# Patient Record
Sex: MALE | Race: WHITE | NOT HISPANIC OR LATINO | Employment: OTHER | URBAN - METROPOLITAN AREA
[De-identification: names, ages, dates, MRNs, and addresses within clinical notes are randomized per-mention and may not be internally consistent; named-entity substitution may affect disease eponyms.]

---

## 2017-01-01 ENCOUNTER — OFFICE VISIT (OUTPATIENT)
Dept: ENDOCRINOLOGY | Age: 82
End: 2017-01-01

## 2017-01-01 ENCOUNTER — TELEPHONE (OUTPATIENT)
Dept: CARDIOLOGY | Facility: CLINIC | Age: 82
End: 2017-01-01

## 2017-01-01 ENCOUNTER — TRANSCRIBE ORDERS (OUTPATIENT)
Dept: ADMINISTRATIVE | Facility: HOSPITAL | Age: 82
End: 2017-01-01

## 2017-01-01 ENCOUNTER — OFFICE VISIT (OUTPATIENT)
Dept: FAMILY MEDICINE CLINIC | Facility: CLINIC | Age: 82
End: 2017-01-01

## 2017-01-01 ENCOUNTER — TELEPHONE (OUTPATIENT)
Dept: FAMILY MEDICINE CLINIC | Facility: CLINIC | Age: 82
End: 2017-01-01

## 2017-01-01 ENCOUNTER — OFFICE VISIT (OUTPATIENT)
Dept: CARDIOLOGY | Facility: CLINIC | Age: 82
End: 2017-01-01

## 2017-01-01 ENCOUNTER — LAB (OUTPATIENT)
Dept: LAB | Facility: HOSPITAL | Age: 82
End: 2017-01-01
Attending: INTERNAL MEDICINE

## 2017-01-01 VITALS
OXYGEN SATURATION: 95 % | SYSTOLIC BLOOD PRESSURE: 120 MMHG | DIASTOLIC BLOOD PRESSURE: 78 MMHG | WEIGHT: 156 LBS | TEMPERATURE: 97.5 F | HEIGHT: 71 IN | BODY MASS INDEX: 21.84 KG/M2 | HEART RATE: 76 BPM

## 2017-01-01 VITALS
DIASTOLIC BLOOD PRESSURE: 68 MMHG | BODY MASS INDEX: 21.65 KG/M2 | TEMPERATURE: 98 F | WEIGHT: 155.2 LBS | SYSTOLIC BLOOD PRESSURE: 128 MMHG

## 2017-01-01 VITALS
HEART RATE: 67 BPM | DIASTOLIC BLOOD PRESSURE: 64 MMHG | BODY MASS INDEX: 22.12 KG/M2 | SYSTOLIC BLOOD PRESSURE: 122 MMHG | WEIGHT: 158 LBS | HEIGHT: 71 IN

## 2017-01-01 VITALS
OXYGEN SATURATION: 91 % | DIASTOLIC BLOOD PRESSURE: 82 MMHG | BODY MASS INDEX: 22.68 KG/M2 | HEIGHT: 71 IN | HEART RATE: 72 BPM | SYSTOLIC BLOOD PRESSURE: 128 MMHG | WEIGHT: 162 LBS

## 2017-01-01 VITALS
HEIGHT: 71 IN | HEART RATE: 71 BPM | DIASTOLIC BLOOD PRESSURE: 60 MMHG | BODY MASS INDEX: 21.84 KG/M2 | WEIGHT: 156 LBS | SYSTOLIC BLOOD PRESSURE: 140 MMHG

## 2017-01-01 DIAGNOSIS — I25.10 CAD S/P PERCUTANEOUS CORONARY ANGIOPLASTY: Primary | ICD-10-CM

## 2017-01-01 DIAGNOSIS — Z79.4 ENCOUNTER FOR LONG-TERM (CURRENT) USE OF INSULIN (HCC): ICD-10-CM

## 2017-01-01 DIAGNOSIS — D63.1 ANEMIA SECONDARY TO RENAL FAILURE: ICD-10-CM

## 2017-01-01 DIAGNOSIS — IMO0002 UNCONTROLLED TYPE 2 DIABETES MELLITUS WITH DIABETIC NEUROPATHY, WITH LONG-TERM CURRENT USE OF INSULIN: ICD-10-CM

## 2017-01-01 DIAGNOSIS — N18.9 ANEMIA SECONDARY TO RENAL FAILURE: ICD-10-CM

## 2017-01-01 DIAGNOSIS — Z89.511 HISTORY OF AMPUTATION OF RIGHT LOWER EXTREMITY THROUGH TIBIA AND FIBULA (HCC): ICD-10-CM

## 2017-01-01 DIAGNOSIS — F41.9 ANXIETY: Primary | ICD-10-CM

## 2017-01-01 DIAGNOSIS — N18.9 ANEMIA IN CHRONIC KIDNEY DISEASE (CKD): ICD-10-CM

## 2017-01-01 DIAGNOSIS — E78.5 DYSLIPIDEMIA: ICD-10-CM

## 2017-01-01 DIAGNOSIS — I48.20 CHRONIC ATRIAL FIBRILLATION (HCC): ICD-10-CM

## 2017-01-01 DIAGNOSIS — N18.30 CHRONIC KIDNEY DISEASE, STAGE III (MODERATE) (HCC): ICD-10-CM

## 2017-01-01 DIAGNOSIS — R12 HEARTBURN: ICD-10-CM

## 2017-01-01 DIAGNOSIS — E16.1 HYPERINSULINISM: ICD-10-CM

## 2017-01-01 DIAGNOSIS — N18.30 CHRONIC KIDNEY DISEASE, STAGE III (MODERATE) (HCC): Primary | ICD-10-CM

## 2017-01-01 DIAGNOSIS — E55.9 VITAMIN D DEFICIENCY: ICD-10-CM

## 2017-01-01 DIAGNOSIS — E55.9 VITAMIN D DEFICIENCY: Primary | ICD-10-CM

## 2017-01-01 DIAGNOSIS — Z98.61 CAD S/P PERCUTANEOUS CORONARY ANGIOPLASTY: Primary | ICD-10-CM

## 2017-01-01 DIAGNOSIS — D63.1 ANEMIA IN CHRONIC KIDNEY DISEASE (CKD): ICD-10-CM

## 2017-01-01 DIAGNOSIS — IMO0002 UNCONTROLLED TYPE II DIABETES MELLITUS WITH NEPHROPATHY: Primary | ICD-10-CM

## 2017-01-01 DIAGNOSIS — IMO0002 UNCONTROLLED TYPE 2 DIABETES MELLITUS WITH COMPLICATION, WITH LONG-TERM CURRENT USE OF INSULIN: ICD-10-CM

## 2017-01-01 DIAGNOSIS — E55.9 VITAMIN D DEFICIENCY DISEASE: ICD-10-CM

## 2017-01-01 DIAGNOSIS — I77.9 PERIPHERAL ARTERIAL OCCLUSIVE DISEASE (HCC): ICD-10-CM

## 2017-01-01 DIAGNOSIS — N18.9 CHRONIC KIDNEY DISEASE, UNSPECIFIED CKD STAGE: ICD-10-CM

## 2017-01-01 DIAGNOSIS — J40 BRONCHITIS: Primary | ICD-10-CM

## 2017-01-01 LAB
25(OH)D3 SERPL-MCNC: 34.6 NG/ML
25(OH)D3 SERPL-MCNC: 45.4 NG/ML
ALBUMIN SERPL-MCNC: 4 G/DL (ref 3.5–5.2)
ALBUMIN SERPL-MCNC: 4 G/DL (ref 3.5–5.2)
ALBUMIN/CREAT UR: 102.3 MG/G CREAT (ref 0–30)
ALBUMIN/GLOB SERPL: 1.4 G/DL
ALBUMIN/GLOB SERPL: 1.5 G/DL
ALP SERPL-CCNC: 84 U/L (ref 39–117)
ALP SERPL-CCNC: 87 U/L (ref 39–117)
ALT SERPL-CCNC: 18 U/L (ref 1–41)
ALT SERPL-CCNC: 20 U/L (ref 1–41)
ANION GAP SERPL CALCULATED.3IONS-SCNC: 17.5 MMOL/L
AST SERPL-CCNC: 18 U/L (ref 1–40)
AST SERPL-CCNC: 23 U/L (ref 1–40)
BACTERIA UR QL AUTO: ABNORMAL /HPF
BACTERIA UR QL AUTO: ABNORMAL /HPF
BASOPHILS # BLD AUTO: 0.07 10*3/MM3 (ref 0–0.2)
BASOPHILS # BLD AUTO: 0.09 10*3/MM3 (ref 0–0.2)
BASOPHILS NFR BLD AUTO: 0.7 % (ref 0–2)
BASOPHILS NFR BLD AUTO: 1 % (ref 0–2)
BILIRUB SERPL-MCNC: 0.4 MG/DL (ref 0.1–1.2)
BILIRUB SERPL-MCNC: 0.5 MG/DL (ref 0.1–1.2)
BILIRUB UR QL STRIP: ABNORMAL
BILIRUB UR QL STRIP: NEGATIVE
BUN BLD-MCNC: 27 MG/DL (ref 8–23)
BUN SERPL-MCNC: 25 MG/DL (ref 8–23)
BUN SERPL-MCNC: 26 MG/DL (ref 8–23)
BUN/CREAT SERPL: 17.4 (ref 7–25)
BUN/CREAT SERPL: 17.7 (ref 7–25)
BUN/CREAT SERPL: 20.8 (ref 7–25)
CALCIUM SERPL-MCNC: 9.4 MG/DL (ref 8.6–10.5)
CALCIUM SERPL-MCNC: 9.5 MG/DL (ref 8.6–10.5)
CALCIUM SPEC-SCNC: 8.6 MG/DL (ref 8.8–10.5)
CHLORIDE SERPL-SCNC: 101 MMOL/L (ref 98–107)
CHLORIDE SERPL-SCNC: 102 MMOL/L (ref 98–107)
CHLORIDE SERPL-SCNC: 105 MMOL/L (ref 98–107)
CLARITY UR: CLEAR
CLARITY UR: CLEAR
CO2 SERPL-SCNC: 21.5 MMOL/L (ref 22–29)
CO2 SERPL-SCNC: 23.4 MMOL/L (ref 22–29)
CO2 SERPL-SCNC: 25.9 MMOL/L (ref 22–29)
COLOR UR: YELLOW
COLOR UR: YELLOW
CREAT BLD-MCNC: 1.55 MG/DL (ref 0.76–1.27)
CREAT SERPL-MCNC: 1.25 MG/DL (ref 0.76–1.27)
CREAT SERPL-MCNC: 1.41 MG/DL (ref 0.76–1.27)
CREAT UR-MCNC: 30.7 MG/DL
DEPRECATED RDW RBC AUTO: 48.2 FL (ref 37–54)
DEPRECATED RDW RBC AUTO: 53.6 FL (ref 37–54)
EOSINOPHIL # BLD AUTO: 0.28 10*3/MM3 (ref 0.1–0.3)
EOSINOPHIL # BLD AUTO: 0.33 10*3/MM3 (ref 0.1–0.3)
EOSINOPHIL NFR BLD AUTO: 3.2 % (ref 0–4)
EOSINOPHIL NFR BLD AUTO: 3.2 % (ref 0–4)
ERYTHROCYTE [DISTWIDTH] IN BLOOD BY AUTOMATED COUNT: 14.2 % (ref 11.5–14.5)
ERYTHROCYTE [DISTWIDTH] IN BLOOD BY AUTOMATED COUNT: 14.8 % (ref 11.5–14.5)
EXPIRATION DATE: NORMAL
FLUAV AG NPH QL: NEGATIVE
FLUBV AG NPH QL: NEGATIVE
GFR SERPL CREATININE-BSD FRML MDRD: 43 ML/MIN/1.73
GLOBULIN SER CALC-MCNC: 2.7 GM/DL
GLOBULIN SER CALC-MCNC: 2.9 GM/DL
GLUCOSE BLD-MCNC: 221 MG/DL (ref 65–99)
GLUCOSE SERPL-MCNC: 182 MG/DL (ref 65–99)
GLUCOSE SERPL-MCNC: 70 MG/DL (ref 65–99)
GLUCOSE UR STRIP-MCNC: ABNORMAL MG/DL
GLUCOSE UR STRIP-MCNC: NEGATIVE MG/DL
HBA1C MFR BLD: 8.43 % (ref 4.8–5.6)
HBA1C MFR BLD: 9.2 % (ref 4.8–5.6)
HCT VFR BLD AUTO: 40.5 % (ref 42–52)
HCT VFR BLD AUTO: 41.1 % (ref 42–52)
HGB BLD-MCNC: 12.8 G/DL (ref 14–18)
HGB BLD-MCNC: 13 G/DL (ref 14–18)
HGB UR QL STRIP.AUTO: ABNORMAL
HGB UR QL STRIP.AUTO: NEGATIVE
HYALINE CASTS UR QL AUTO: ABNORMAL /LPF
HYALINE CASTS UR QL AUTO: ABNORMAL /LPF
IMM GRANULOCYTES # BLD: 0.04 10*3/MM3 (ref 0–0.03)
IMM GRANULOCYTES # BLD: 0.05 10*3/MM3 (ref 0–0.03)
IMM GRANULOCYTES NFR BLD: 0.4 % (ref 0–0.5)
IMM GRANULOCYTES NFR BLD: 0.6 % (ref 0–0.5)
INTERNAL CONTROL: NORMAL
KETONES UR QL STRIP: ABNORMAL
KETONES UR QL STRIP: NEGATIVE
LEUKOCYTE ESTERASE UR QL STRIP.AUTO: NEGATIVE
LEUKOCYTE ESTERASE UR QL STRIP.AUTO: NEGATIVE
LYMPHOCYTES # BLD AUTO: 0.98 10*3/MM3 (ref 0.6–4.8)
LYMPHOCYTES # BLD AUTO: 1.39 10*3/MM3 (ref 0.6–4.8)
LYMPHOCYTES NFR BLD AUTO: 11.3 % (ref 20–45)
LYMPHOCYTES NFR BLD AUTO: 13.4 % (ref 20–45)
Lab: NORMAL
MCH RBC QN AUTO: 29.3 PG (ref 27–31)
MCH RBC QN AUTO: 30.9 PG (ref 27–31)
MCHC RBC AUTO-ENTMCNC: 31.6 G/DL (ref 31–37)
MCHC RBC AUTO-ENTMCNC: 31.6 G/DL (ref 31–37)
MCV RBC AUTO: 92.8 FL (ref 80–94)
MCV RBC AUTO: 97.8 FL (ref 80–94)
MICROALBUMIN UR-MCNC: 31.4 UG/ML
MONOCYTES # BLD AUTO: 0.62 10*3/MM3 (ref 0–1)
MONOCYTES # BLD AUTO: 0.66 10*3/MM3 (ref 0–1)
MONOCYTES NFR BLD AUTO: 6 % (ref 3–8)
MONOCYTES NFR BLD AUTO: 7.6 % (ref 3–8)
NEUTROPHILS # BLD AUTO: 6.65 10*3/MM3 (ref 1.5–8.3)
NEUTROPHILS # BLD AUTO: 7.91 10*3/MM3 (ref 1.5–8.3)
NEUTROPHILS NFR BLD AUTO: 76.3 % (ref 45–70)
NEUTROPHILS NFR BLD AUTO: 76.3 % (ref 45–70)
NITRITE UR QL STRIP: NEGATIVE
NITRITE UR QL STRIP: NEGATIVE
NRBC BLD MANUAL-RTO: 0 /100 WBC (ref 0–0)
NRBC BLD MANUAL-RTO: 0 /100 WBC (ref 0–0)
PH UR STRIP.AUTO: <=5 [PH] (ref 4.5–8)
PH UR STRIP.AUTO: <=5 [PH] (ref 4.5–8)
PHOSPHATE SERPL-MCNC: 3.3 MG/DL (ref 2.7–4.5)
PHOSPHATE SERPL-MCNC: 3.3 MG/DL (ref 2.7–4.5)
PLATELET # BLD AUTO: 199 10*3/MM3 (ref 140–500)
PLATELET # BLD AUTO: 210 10*3/MM3 (ref 140–500)
PMV BLD AUTO: 10.9 FL (ref 7.4–10.4)
PMV BLD AUTO: 11 FL (ref 7.4–10.4)
POTASSIUM BLD-SCNC: 4.5 MMOL/L (ref 3.5–5.2)
POTASSIUM SERPL-SCNC: 4.5 MMOL/L (ref 3.5–5.2)
POTASSIUM SERPL-SCNC: 5.1 MMOL/L (ref 3.5–5.2)
PROT SERPL-MCNC: 6.7 G/DL (ref 6–8.5)
PROT SERPL-MCNC: 6.9 G/DL (ref 6–8.5)
PROT UR QL STRIP: ABNORMAL
PROT UR QL STRIP: ABNORMAL
PTH-INTACT SERPL-MCNC: 171 PG/ML (ref 15–65)
PTH-INTACT SERPL-MCNC: 94.5 PG/ML (ref 15–65)
RBC # BLD AUTO: 4.14 10*6/MM3 (ref 4.7–6.1)
RBC # BLD AUTO: 4.43 10*6/MM3 (ref 4.7–6.1)
RBC # UR: ABNORMAL /HPF
RBC # UR: ABNORMAL /HPF
REF LAB TEST METHOD: ABNORMAL
REF LAB TEST METHOD: ABNORMAL
SODIUM BLD-SCNC: 144 MMOL/L (ref 136–145)
SODIUM SERPL-SCNC: 142 MMOL/L (ref 136–145)
SODIUM SERPL-SCNC: 143 MMOL/L (ref 136–145)
SP GR UR STRIP: 1.02 (ref 1–1.03)
SP GR UR STRIP: 1.02 (ref 1–1.03)
SQUAMOUS #/AREA URNS HPF: ABNORMAL /HPF
SQUAMOUS #/AREA URNS HPF: ABNORMAL /HPF
TSH SERPL DL<=0.005 MIU/L-ACNC: 2.54 MIU/ML (ref 0.27–4.2)
TSH SERPL DL<=0.005 MIU/L-ACNC: 3.13 MIU/ML (ref 0.27–4.2)
UROBILINOGEN UR QL STRIP: ABNORMAL
UROBILINOGEN UR QL STRIP: ABNORMAL
WBC NRBC COR # BLD: 10.36 10*3/MM3 (ref 4.8–10.8)
WBC NRBC COR # BLD: 8.71 10*3/MM3 (ref 4.8–10.8)
WBC UR QL AUTO: ABNORMAL /HPF
WBC UR QL AUTO: ABNORMAL /HPF

## 2017-01-01 PROCEDURE — 84100 ASSAY OF PHOSPHORUS: CPT

## 2017-01-01 PROCEDURE — 80048 BASIC METABOLIC PNL TOTAL CA: CPT

## 2017-01-01 PROCEDURE — 85025 COMPLETE CBC W/AUTO DIFF WBC: CPT

## 2017-01-01 PROCEDURE — 99214 OFFICE O/P EST MOD 30 MIN: CPT | Performed by: INTERNAL MEDICINE

## 2017-01-01 PROCEDURE — 81001 URINALYSIS AUTO W/SCOPE: CPT

## 2017-01-01 PROCEDURE — 82306 VITAMIN D 25 HYDROXY: CPT

## 2017-01-01 PROCEDURE — 83970 ASSAY OF PARATHORMONE: CPT

## 2017-01-01 PROCEDURE — 36415 COLL VENOUS BLD VENIPUNCTURE: CPT

## 2017-01-01 PROCEDURE — 99213 OFFICE O/P EST LOW 20 MIN: CPT | Performed by: FAMILY MEDICINE

## 2017-01-01 PROCEDURE — 99213 OFFICE O/P EST LOW 20 MIN: CPT | Performed by: INTERNAL MEDICINE

## 2017-01-01 PROCEDURE — 93000 ELECTROCARDIOGRAM COMPLETE: CPT | Performed by: INTERNAL MEDICINE

## 2017-01-01 PROCEDURE — 87804 INFLUENZA ASSAY W/OPTIC: CPT | Performed by: FAMILY MEDICINE

## 2017-01-01 PROCEDURE — 94640 AIRWAY INHALATION TREATMENT: CPT | Performed by: FAMILY MEDICINE

## 2017-01-01 RX ORDER — PANTOPRAZOLE SODIUM 40 MG/1
TABLET, DELAYED RELEASE ORAL
Qty: 90 TABLET | Refills: 0 | Status: SHIPPED | OUTPATIENT
Start: 2017-01-01 | End: 2017-01-01

## 2017-01-01 RX ORDER — ALPRAZOLAM 0.25 MG/1
TABLET ORAL
Qty: 30 TABLET | Refills: 0 | Status: SHIPPED | OUTPATIENT
Start: 2017-01-01 | End: 2017-01-01 | Stop reason: SDUPTHER

## 2017-01-01 RX ORDER — LOVASTATIN 40 MG/1
TABLET ORAL
Qty: 90 TABLET | Refills: 0 | Status: SHIPPED | OUTPATIENT
Start: 2017-01-01 | End: 2017-01-01 | Stop reason: SDUPTHER

## 2017-01-01 RX ORDER — FUROSEMIDE 20 MG/1
20 TABLET ORAL DAILY
Qty: 90 TABLET | Refills: 3 | Status: SHIPPED | OUTPATIENT
Start: 2017-01-01

## 2017-01-01 RX ORDER — CITALOPRAM 20 MG/1
20 TABLET ORAL DAILY
Qty: 90 TABLET | Refills: 3 | Status: SHIPPED | OUTPATIENT
Start: 2017-01-01

## 2017-01-01 RX ORDER — ALPRAZOLAM 0.25 MG/1
0.25 TABLET ORAL 2 TIMES DAILY PRN
Qty: 30 TABLET | Refills: 0 | OUTPATIENT
Start: 2017-01-01 | End: 2017-01-01 | Stop reason: SDUPTHER

## 2017-01-01 RX ORDER — LOVASTATIN 40 MG/1
40 TABLET ORAL NIGHTLY
Qty: 90 TABLET | Refills: 3 | Status: SHIPPED | OUTPATIENT
Start: 2017-01-01

## 2017-01-01 RX ORDER — INSULIN LISPRO 100 [IU]/ML
INJECTION, SOLUTION INTRAVENOUS; SUBCUTANEOUS
Qty: 30 ML | Refills: 1 | Status: SHIPPED | OUTPATIENT
Start: 2017-01-01

## 2017-01-01 RX ORDER — ALPRAZOLAM 0.25 MG/1
0.25 TABLET ORAL 2 TIMES DAILY PRN
Qty: 30 TABLET | Refills: 5 | Status: SHIPPED | OUTPATIENT
Start: 2017-01-01

## 2017-01-01 RX ORDER — CEPHALEXIN 250 MG/1
250 CAPSULE ORAL 4 TIMES DAILY
Qty: 28 CAPSULE | Refills: 0 | Status: SHIPPED | OUTPATIENT
Start: 2017-01-01 | End: 2018-01-01

## 2017-01-01 RX ORDER — LOVASTATIN 40 MG/1
40 TABLET ORAL NIGHTLY
Qty: 30 TABLET | Refills: 0 | Status: SHIPPED | OUTPATIENT
Start: 2017-01-01 | End: 2017-01-01 | Stop reason: SDUPTHER

## 2017-01-01 RX ORDER — LOVASTATIN 40 MG/1
TABLET ORAL
Qty: 90 TABLET | OUTPATIENT
Start: 2017-01-01

## 2017-01-01 RX ORDER — PANTOPRAZOLE SODIUM 40 MG/1
TABLET, DELAYED RELEASE ORAL
Qty: 90 TABLET | Refills: 0 | Status: SHIPPED | OUTPATIENT
Start: 2017-01-01 | End: 2017-01-01 | Stop reason: SDUPTHER

## 2017-01-01 RX ORDER — FUROSEMIDE 20 MG/1
TABLET ORAL
Qty: 90 TABLET | Refills: 1 | Status: SHIPPED | OUTPATIENT
Start: 2017-01-01 | End: 2017-01-01 | Stop reason: SDUPTHER

## 2017-01-01 RX ORDER — CARVEDILOL 3.12 MG/1
3.12 TABLET ORAL
Qty: 30 TABLET | Refills: 1 | Status: SHIPPED | OUTPATIENT
Start: 2017-01-01 | End: 2017-01-01 | Stop reason: ALTCHOICE

## 2017-01-01 RX ORDER — PANTOPRAZOLE SODIUM 40 MG/1
40 TABLET, DELAYED RELEASE ORAL DAILY
Qty: 90 TABLET | Refills: 0 | Status: SHIPPED | OUTPATIENT
Start: 2017-01-01 | End: 2017-01-01 | Stop reason: SDUPTHER

## 2017-01-01 RX ORDER — RANITIDINE 150 MG/1
150 TABLET ORAL 2 TIMES DAILY
Qty: 180 TABLET | Refills: 3 | Status: SHIPPED | OUTPATIENT
Start: 2017-01-01

## 2017-01-01 RX ORDER — HYDROCORTISONE ACETATE 25 MG
SUPPOSITORY, RECTAL RECTAL
COMMUNITY
Start: 2017-01-01

## 2017-01-01 RX ORDER — FUROSEMIDE 20 MG/1
TABLET ORAL
Qty: 90 TABLET | Refills: 0 | Status: SHIPPED | OUTPATIENT
Start: 2017-01-01 | End: 2017-01-01 | Stop reason: SDUPTHER

## 2017-01-01 RX ORDER — SILVER SULFADIAZINE 1 %
CREAM (GRAM) TOPICAL
COMMUNITY
Start: 2017-01-01 | End: 2017-01-01

## 2017-01-06 ENCOUNTER — OFFICE VISIT (OUTPATIENT)
Dept: ENDOCRINOLOGY | Age: 82
End: 2017-01-06

## 2017-01-06 VITALS
BODY MASS INDEX: 22.88 KG/M2 | DIASTOLIC BLOOD PRESSURE: 58 MMHG | OXYGEN SATURATION: 98 % | WEIGHT: 163.4 LBS | HEART RATE: 67 BPM | SYSTOLIC BLOOD PRESSURE: 122 MMHG | HEIGHT: 71 IN

## 2017-01-06 DIAGNOSIS — E16.1 HYPERINSULINISM: ICD-10-CM

## 2017-01-06 DIAGNOSIS — IMO0002 UNCONTROLLED TYPE 2 DIABETES MELLITUS WITH COMPLICATION, WITH LONG-TERM CURRENT USE OF INSULIN: Primary | ICD-10-CM

## 2017-01-06 DIAGNOSIS — IMO0002 UNCONTROLLED TYPE 2 DIABETES MELLITUS WITH DIABETIC NEUROPATHY, WITH LONG-TERM CURRENT USE OF INSULIN: ICD-10-CM

## 2017-01-06 DIAGNOSIS — IMO0002 UNCONTROLLED TYPE II DIABETES MELLITUS WITH NEPHROPATHY: ICD-10-CM

## 2017-01-06 DIAGNOSIS — Z79.4 ENCOUNTER FOR LONG-TERM (CURRENT) USE OF INSULIN (HCC): ICD-10-CM

## 2017-01-06 PROCEDURE — 99214 OFFICE O/P EST MOD 30 MIN: CPT | Performed by: INTERNAL MEDICINE

## 2017-01-06 RX ORDER — ERGOCALCIFEROL 1.25 MG/1
CAPSULE ORAL
COMMUNITY
Start: 2016-12-28 | End: 2017-01-01

## 2017-01-06 NOTE — MR AVS SNAPSHOT
Santyagustin Quiroz   1/6/2017 11:30 AM   Office Visit    Dept Phone:  [phone]   Encounter #:  [id number]    Provider:  Deandre JASON MD   Department:  [facility] ENDOCRINOLOGY                Your Full Care Plan              Your Updated Medication List          This list is accurate as of: 1/6/17 12:34 PM.  Always use your most recent med list.                ALPRAZolam 0.25 MG tablet   Commonly known as:  XANAX   Take 1 tablet by mouth 2 (Two) Times a Day As Needed for anxiety or sleep.       aspirin 81 MG tablet       citalopram 20 MG tablet   Commonly known as:  CeleXA   Take 1 tablet by mouth Daily.       dabigatran etexilate 75 MG capsule   Commonly known as:  PRADAXA       finasteride 5 MG tablet   Commonly known as:  PROSCAR       fluticasone 50 MCG/ACT nasal spray   Commonly known as:  FLONASE       furosemide 20 MG tablet   Commonly known as:  LASIX   TAKE 1 TABLET DAILY AS DIRECTED       glucose blood test strip   Commonly known as:  ONE TOUCH ULTRA TEST   1 each by Other route 5 (five) times a day. TO TEST BLOOD SUGARS 5 TIMES DAILY       Insulin Glargine 100 UNIT/ML injection pen   Commonly known as:  LANTUS SOLOSTAR       insulin lispro 100 UNIT/ML injection   Commonly known as:  humaLOG       loratadine 10 MG tablet   Commonly known as:  CLARITIN       lovastatin 40 MG tablet   Commonly known as:  MEVACOR   TAKE 1 TABLET DAILY AS DIRECTED       metoprolol tartrate 25 MG tablet   Commonly known as:  LOPRESSOR   TAKE ONE HALF TABLET EVERY OTHER DAY OR AS DIRECTED       mupirocin 2 % ointment   Commonly known as:  BACTROBAN       pantoprazole 40 MG EC tablet   Commonly known as:  PROTONIX       vitamin D 23503 UNITS capsule capsule   Commonly known as:  ERGOCALCIFEROL               We Performed the Following     Comprehensive Metabolic Panel     Hemoglobin A1c       You Were Diagnosed With        Codes Comments    Uncontrolled type 2 diabetes  mellitus with complication, with long-term current use of insulin    -  Primary ICD-10-CM: E11.8, E11.65, Z79.4  ICD-9-CM: 250.82, V58.67     Hyperinsulinism     ICD-10-CM: E16.1  ICD-9-CM: 251.1     Uncontrolled type 2 diabetes mellitus with diabetic neuropathy, with long-term current use of insulin     ICD-10-CM: E11.40, Z79.4, E11.65  ICD-9-CM: 250.62, 357.2, V58.67     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     ICD-10-CM: E11.21, E11.65  ICD-9-CM: 250.42, 583.81     Encounter for long-term (current) use of insulin     ICD-10-CM: Z79.4  ICD-9-CM: V58.67       Instructions    I advised him to take Lantus 14 units in the morning    Mealtime Humalog     If the blood sugars below 200 then he gets 6 units for the meal  If the blood sugar is a greater than 200 then he gets 8 units for the meal  I also recommended that he may not get more than 4 units at lunch    Bedtime    If blood sugar less than 300 he gets no insulin  He blood sugar greater than 300 he gets 2 units  If blood sugar greater than 400 he gets 3 units        Patient Instructions History      Upcoming Appointments     Visit Type Date Time Department    OFFICE VISIT 2017 11:30 AM JOSIAH HARO    OFFICE VISIT 2017  1:30 PM JOSIAH HARO      ipvive Signup     [facility] ipvive allows you to send messages to your doctor, view your test results, renew your prescriptions, schedule appointments, and more. To sign up, go to [employer]Plan and click on the Sign Up Now link in the New User? box. Enter your ipvive Activation Code exactly as it appears below along with the last four digits of your Social Security Number and your Date of Birth () to complete the sign-up process. If you do not sign up before the expiration date, you must request a new code.    ipvive Activation Code: [email or web address]  Expires: 1/10/2017  2:31 PM    If you have questions, you can email [email or web address] or call  "[phone] to talk to our MyChart staff. Remember, MyChart is NOT to be used for urgent needs. For medical emergencies, dial 911.               Other Info from Your Visit           Your Appointments     Apr 11, 2017  1:30 PM EDT   Office Visit with Deandre JASON MD   [facility] ENDOCRINOLOGY (--)    [address] [facility]. [address] [place] [id number] [phone]           Arrive 15 minutes prior to appointment.              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

## 2017-01-06 NOTE — PROGRESS NOTES
84 y.o.    Patient Care Team:  Wilmer Parada MD as PCP - General (Family Medicine)  Niranjan Krueger MD as Consulting Physician (Cardiology)  Deandre JASON MD as Consulting Physician (Endocrinology)    Chief Complaint:      F/U TYPE 2 DIABETES, UNCONTROLLED.  WAS IN [facility] ON 12/5/16  Subjective     HPI  84-year-old white male with a past medical history of uncontrolled type 2 diabetes mellitus came for followup    Patient is accompanied by his wife and son  The report that his blood sugars are fluctuating between   Patient's wife is administering the insulin for the most part and is extremely afraid of hypoglycemia.  She still giving him more NovoLog than I recommended as per his son  Hemoglobin A1c has been going down I recommended that he must try and keep his hemoglobin A1c greater than 8% at his age  Uncontrolled type 2 diabetes mellitus with nephropathy and chronic kidney disease and elevated creatinine of 1.57  Patient has followup with nephrology  Hypoglycemia  Significantly reduced episodes of hypoglycemia since adjusting insulin but patient's wife is still trying to adjust insulin to keep his blood sugars below 200  Patient apparently was admitted to the hospital for gastroenteritis approximately 3 weeks ago.    Interval History    SUMMARY  Patient was seen in April 2013 for the first time and was advised to continue Lantus 12 units in the morning and Humalog scale of 1 unit for every 20 carbs  Patient was accompanied by his wife . They both mentioned that his hypoglycemic episodes have decreased significantly even though he still has them at least once or twice a week. But they do not appear to be as severe as they were before the lowest reading they recall is 50  patient was given a plan for 3 units of NovoLog if the blood sugars more than 100 before meal,  And 2 units of NovoLog if the blood sugars less than 100 before meals.  Patient and family report that he has had  significant success with this plan. He was recently sick and was admitted in the hospital and had a few blood sugars that are in the 200-300 range.but otherwise his blood sugars have been significantly improving and mostly below 200.  the only time his blood sugars are elevated are usually after dinner  Patient denied any knowledge of diabetic retinopathy.  Patient denied any symptoms of diabetic neuropathy.  Diabetic nephropathy  Patient has diabetic nephropathy and chronic kidney disease with elevated creatinine of 1.57  Wife reported that he had a small friction area the bottom of the stump but it is healing well. He is [facility]  Hypoglycemia  Patient has had episodes of hypoglycemia but usually does a good explanation. He has had to early-morning hypoglycemic episodes which are rather unclear. Patient's wife reports that he feels blood sugars less than 200 and he does not eat any snack at night then his blood sugar been low at next morning   Interval History  Historian is patient's wife  She reported that he had a few hypoglycemic episodes but not nearly as many as in the previous visit.  Most of the hypoglycemia appears to be before dinnertime.  Patient blood sugars are still fluctuating from     The following portions of the patient's history were reviewed and updated as appropriate: allergies, current medications, past family history, past medical history, past social history, past surgical history and problem list.    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 Cooper, s/p RLE BKA   • Pulmonary hypertension      RHC 1/2008 with mean PA 50mm Hg, wedge 23 mm Hg; secondary to diastolic dysfunction   • Renal artery stenosis      s/p prior bilateral stenting in [place], now f/b Dr. Cooper   • Varicella zoster      Family History   Problem Relation Age of Onset   • Cancer Brother    • Heart attack Brother      acute MI     Social History     Social History   • Marital status:      Spouse name: N/A   • Number of children: N/A   • Years of education: N/A     Occupational History   • Not on file.     Social History Main Topics   • Smoking status: Never Smoker   • Smokeless tobacco: Not on file   • Alcohol use No   • Drug use: Not on file   • Sexual activity: Not on file     Other Topics Concern   • Not on file     Social History Narrative     Allergies   Allergen Reactions   • Codeine    • Meperidine    • Tape        Current Outpatient Prescriptions:   •  ALPRAZolam (XANAX) 0.25 MG tablet, Take 1 tablet by mouth 2 (Two) Times a Day As Needed for anxiety or sleep., Disp: 30 tablet, Rfl: 5  •  aspirin 81 MG tablet, Take  by mouth daily., Disp: , Rfl:   •  citalopram (CeleXA) 20 MG tablet, Take 1 tablet by mouth Daily., Disp: 90 tablet, Rfl: 3  •  dabigatran etexilate (PRADAXA) 75 MG capsule, Take 1 capsule by mouth., Disp: , Rfl:   •  finasteride (PROSCAR)  "5 MG tablet, Take  by mouth daily., Disp: , Rfl:   •  fluticasone (FLONASE) 50 MCG/ACT nasal spray, into each nostril 2 (two) times a day., Disp: , Rfl:   •  furosemide (LASIX) 20 MG tablet, TAKE 1 TABLET DAILY AS DIRECTED, Disp: 90 tablet, Rfl: 2  •  glucose blood (ONE TOUCH ULTRA TEST) test strip, 1 each by Other route 5 (five) times a day. TO TEST BLOOD SUGARS 5 TIMES DAILY, Disp: 450 each, Rfl: 3  •  Insulin Glargine 100 UNIT/ML solution pen-injector, Inject  under the skin., Disp: , Rfl:   •  insulin lispro (HumaLOG) 100 UNIT/ML injection, Inject  under the skin 3 (three) times a day., Disp: , Rfl:   •  loratadine (CLARITIN) 10 MG tablet, Take  by mouth., Disp: , Rfl:   •  lovastatin (MEVACOR) 40 MG tablet, TAKE 1 TABLET DAILY AS DIRECTED, Disp: 90 tablet, Rfl: 3  •  metoprolol tartrate (LOPRESSOR) 25 MG tablet, TAKE ONE HALF TABLET EVERY OTHER DAY OR AS DIRECTED, Disp: 35 tablet, Rfl: 1  •  mupirocin (BACTROBAN) 2 % ointment, , Disp: , Rfl:   •  pantoprazole (PROTONIX) 40 MG EC tablet, Daily., Disp: , Rfl:         Review of Systems   Constitutional: Negative for chills, fatigue and fever.   Cardiovascular: Negative for chest pain and palpitations.   Gastrointestinal: Negative for abdominal pain, constipation, diarrhea, nausea and vomiting.   Endocrine: Positive for heat intolerance. Negative for cold intolerance.   All other systems reviewed and are negative.      Objective       Vitals:    01/06/17 1152   BP: 122/58   Pulse: 67   SpO2: 98%   Weight: 163 lb 6.4 oz (74.1 kg)   Height: 71\" (180.3 cm)     Body mass index is 22.79 kg/(m^2).      Physical Exam   Constitutional: He is oriented to person, place, and time. He appears well-developed and well-nourished.   HENT:   Head: Normocephalic and atraumatic.   Eyes: EOM are normal. Pupils are equal, round, and reactive to light.   Neck: Normal range of motion. Neck supple. No thyromegaly present.   Cardiovascular: Normal rate, regular rhythm, normal heart sounds " and intact distal pulses.    Pulmonary/Chest: Effort normal and breath sounds normal.   Abdominal: Soft. Bowel sounds are normal. He exhibits no distension. There is no tenderness.   Musculoskeletal: Normal range of motion. He exhibits no edema.   Right below-knee amputation   Neurological: He is alert and oriented to person, place, and time.   Skin: Skin is warm and dry.   Psychiatric: He has a normal mood and affect. His behavior is normal.   Nursing note and vitals reviewed.    Results Review:     I reviewed the patient's new clinical results.    Medical records reviewed  Summary:  Cardiology notes reviewed  Patient is being followed by cardiology for atrial fibrillation and is currently on anticoagulation.  He is reportedly stable    Lab on 10/31/2016   Component Date Value Ref Range Status   • Glucose 10/31/2016 280* 65 - 99 mg/dL Final   • BUN 10/31/2016 26* 8 - 23 mg/dL Final   • Creatinine 10/31/2016 1.69* 0.76 - 1.27 mg/dL Final   • Sodium 10/31/2016 138  136 - 145 mmol/L Final   • Potassium 10/31/2016 4.5  3.5 - 5.2 mmol/L Final   • Chloride 10/31/2016 95* 98 - 107 mmol/L Final   • CO2 10/31/2016 21.7* 22.0 - 29.0 mmol/L Final   • Calcium 10/31/2016 9.1  8.8 - 10.5 mg/dL Final   • eGFR Non African Amer 10/31/2016 39* >60 mL/min/1.73 Final   • BUN/Creatinine Ratio 10/31/2016 15.4  7.0 - 25.0 Final   • Anion Gap 10/31/2016 21.3  mmol/L Final   • Iron 10/31/2016 46* 59 - 158 mcg/dL Final   • Iron Saturation 10/31/2016 22  % Final   • UIBC 10/31/2016 167  112 - 346 mcg/dL Final   • TIBC 10/31/2016 213* 261 - 498 mcg/dL Final   • 25 Hydroxy, Vitamin D 10/31/2016 47.8  ng/ml Final   • Color, UA 10/31/2016 Yellow  Yellow, Straw Final   • Appearance, UA 10/31/2016 Clear  Clear Final   • pH, UA 10/31/2016 6.0  4.5 - 8.0 Final   • Specific Gravity, UA 10/31/2016 1.025  1.003 - 1.030 Final   • Glucose, UA 10/31/2016 >=1000 mg/dL (3+)* Negative Final   • Ketones, UA 10/31/2016 Trace* Negative Final   • Bilirubin, UA  10/31/2016 Negative  Negative Final   • Blood, UA 10/31/2016 Small (1+)* Negative Final   • Protein, UA 10/31/2016 Trace* Negative Final   • Leuk Esterase, UA 10/31/2016 Negative  Negative Final   • Nitrite, UA 10/31/2016 Negative  Negative Final   • Urobilinogen, UA 10/31/2016 0.2 E.U./dL  0.2 E.U./dL, 1.0 E.U./dL Final   • Potassium, Urine 10/31/2016 45.7  mmol/L Final   • WBC 10/31/2016 7.68  4.80 - 10.80 10*3/mm3 Final   • RBC 10/31/2016 4.40* 4.70 - 6.10 10*6/mm3 Final   • Hemoglobin 10/31/2016 13.3* 14.0 - 18.0 g/dL Final   • Hematocrit 10/31/2016 42.3  42.0 - 52.0 % Final   • MCV 10/31/2016 96.1* 80.0 - 94.0 fL Final   • MCH 10/31/2016 30.2  27.0 - 31.0 pg Final   • MCHC 10/31/2016 31.4  31.0 - 37.0 g/dL Final   • RDW 10/31/2016 13.7  11.5 - 14.5 % Final   • RDW-SD 10/31/2016 49.0  37.0 - 54.0 fl Final   • MPV 10/31/2016 10.7* 7.4 - 10.4 fL Final   • Platelets 10/31/2016 300  140 - 500 10*3/mm3 Final   • Neutrophil % 10/31/2016 78.2* 45.0 - 70.0 % Final   • Lymphocyte % 10/31/2016 8.7* 20.0 - 45.0 % Final   • Monocyte % 10/31/2016 6.9  3.0 - 8.0 % Final   • Eosinophil % 10/31/2016 4.8* 0.0 - 4.0 % Final   • Basophil % 10/31/2016 0.7  0.0 - 2.0 % Final   • Immature Grans % 10/31/2016 0.7* 0.0 - 0.5 % Final   • Neutrophils, Absolute 10/31/2016 6.01  1.50 - 8.30 10*3/mm3 Final   • Lymphocytes, Absolute 10/31/2016 0.67  0.60 - 4.80 10*3/mm3 Final   • Monocytes, Absolute 10/31/2016 0.53  0.00 - 1.00 10*3/mm3 Final   • Eosinophils, Absolute 10/31/2016 0.37* 0.10 - 0.30 10*3/mm3 Final   • Basophils, Absolute 10/31/2016 0.05  0.00 - 0.20 10*3/mm3 Final   • Immature Grans, Absolute 10/31/2016 0.05* 0.00 - 0.03 10*3/mm3 Final   • nRBC 10/31/2016 0.0  0.0 - 0.0 /100 WBC Final   • RBC, UA 10/31/2016 0-2* None Seen /HPF Final   • WBC, UA 10/31/2016 0-2* None Seen /HPF Final   • Bacteria, UA 10/31/2016 Trace* None Seen /HPF Final   • Squamous Epithelial Cells, UA 10/31/2016 0-2  None Seen, 0-2 /HPF Final   • Hyaline Casts,  UA 10/31/2016 3-6  None Seen /LPF Final   • Mucus, UA 10/31/2016 Small/1+* None Seen, Trace /HPF Final   • Methodology 10/31/2016 Manual Light Microscopy   Final     Lab Results   Component Value Date    HGBA1C 7.83 (H) 07/01/2016    HGBA1C 9.0 (H) 12/22/2015    HGBA1C 7.9 (H) 09/17/2015     Lab Results   Component Value Date    CREATININE 1.69 (H) 10/31/2016     Imaging Results (most recent)     None                Assessment and Plan:    Santy was seen today for diabetes.    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 Patient is very brittle and fluctuate wildly. His a.m. blood sugars are generally good; nighttime blood sugars are in the 200-300 range.   #2 diabetic nephropathy with elevated creatinine. Patient is currently seeing nephrologist Dr. Avery.  #3 hypoglycemia the incidence of hypoglycemia has come down significantly  Family reports that he is not having as many low episodes, maybe on average once a week./month  #4 diabetic neuropathy:      Status post right below-knee amputation and prosthesis     Considering his age of 84 years,,fear of hypoglycemia and other comorbidities, I recommend that his optimal hemoglobin A1c range would be 7.5% to 8.5%.  patient's last hemoglobin A1c was 7.8% in Sept 2015; 9% in Dec 2015  Patient's glucometer  download reviewed  Blood sugars ranging from  though majority of the blood sugars are in the 100-200 range     I advised the patient to take Lantus 14 units every morning  I also advised the family to follow the scale given very strictly  I advised his wife that he must not get more than 8 units of NovoLog with meal    I advised him to take Lantus 14 units in the morning  Mealtime Humalog   If the blood sugars below 200 then he gets 6 units for the meal  If the blood sugar is a greater than 200 then he gets 8 units for the meal  I also recommended that he may not get more than 4 units at lunch  Bedtime  If blood sugar less than 300 he gets no insulin  He blood sugar greater than 300 he gets 2 units  If blood sugar greater than 400 he gets 3 units       I once again explained to the patient and his wife and their his son that his hemoglobin A1c goal should be between 8-8.5% to 2 multiple comorbid conditions  His daily blood sugar goal should be between 150-250  All of them verbalized understanding    Patient will get lab testing done today  He will return for followup in 3 months.      Deandre Stewart MD. FACE    01/06/17      EMR Dragon / transcription disclaimer:    Much of this encounter note is an electronic transcription/ translation of spoken language to printed text.  Electronic translation of spoken language may permit erroneous, or at times, nonsensical words or phrases to be inadvertently transcribed; although I have reviewed the note for such errors, some may still exist.

## 2017-01-06 NOTE — PATIENT INSTRUCTIONS
I advised him to take Lantus 14 units in the morning    Mealtime Humalog     If the blood sugars below 200 then he gets 6 units for the meal  If the blood sugar is a greater than 200 then he gets 8 units for the meal  I also recommended that he may not get more than 4 units at lunch    Bedtime    If blood sugar less than 300 he gets no insulin  He blood sugar greater than 300 he gets 2 units  If blood sugar greater than 400 he gets 3 units

## 2017-01-07 LAB
ALBUMIN SERPL-MCNC: 3.8 G/DL (ref 3.5–5.2)
ALBUMIN/GLOB SERPL: 1.4 G/DL
ALP SERPL-CCNC: 80 U/L (ref 39–117)
ALT SERPL-CCNC: 13 U/L (ref 1–41)
AST SERPL-CCNC: 16 U/L (ref 1–40)
BILIRUB SERPL-MCNC: 0.4 MG/DL (ref 0.1–1.2)
BUN SERPL-MCNC: 21 MG/DL (ref 8–23)
BUN/CREAT SERPL: 13.8 (ref 7–25)
CALCIUM SERPL-MCNC: 9.4 MG/DL (ref 8.6–10.5)
CHLORIDE SERPL-SCNC: 104 MMOL/L (ref 98–107)
CO2 SERPL-SCNC: 26.8 MMOL/L (ref 22–29)
CREAT SERPL-MCNC: 1.52 MG/DL (ref 0.76–1.27)
GLOBULIN SER CALC-MCNC: 2.7 GM/DL
GLUCOSE SERPL-MCNC: 213 MG/DL (ref 65–99)
HBA1C MFR BLD: 7.5 % (ref 4.8–5.6)
POTASSIUM SERPL-SCNC: 5.1 MMOL/L (ref 3.5–5.2)
PROT SERPL-MCNC: 6.5 G/DL (ref 6–8.5)
SODIUM SERPL-SCNC: 145 MMOL/L (ref 136–145)

## 2017-01-08 RX ORDER — DABIGATRAN ETEXILATE MESYLATE 75 MG/1
CAPSULE ORAL
Qty: 180 CAPSULE | Refills: 3 | Status: SHIPPED | OUTPATIENT
Start: 2017-01-08 | End: 2017-01-01 | Stop reason: ALTCHOICE

## 2017-04-11 NOTE — TELEPHONE ENCOUNTER
Dr. Stewart's office called (Keira)    He was taking blood from patient and he bruised immediately and his blood shot out.  They think you may want to check his blood because it was so thin.  A1C, CMP, TSH, microalbumin testing was what they were drawing for.  We can call patient back on his wife's phone at [phone]. Her name is Adela.  Dr. Barahona's office is [phone].  He is part of [facility].     Thank you,  Jelly

## 2017-04-11 NOTE — TELEPHONE ENCOUNTER
Yes, he is on dabigatran, which is a blood thinner.  Checking labs while on this medication is not advised as they're unreliable.      Will you let her know that?    PRICILA

## 2017-04-11 NOTE — PROGRESS NOTES
85 y.o.    Patient Care Team:  Wilmer Parada MD as PCP - General (Family Medicine)  Tom Montaño MD as PCP - Claims Attributed - PLEASE DO NOT REMOVE  Niranjan Krueger MD as Consulting Physician (Cardiology)  Deandre JASON MD as Consulting Physician (Endocrinology)  Wilmer Parada MD as MSSP ACO Attributed - PLEASE DO NOT REMOVE    Chief Complaint:        F/U TYPE 2 DIABETES, UNCONTROLLED.  NO RECENT LABS.  Subjective     HPI     Patient is 85-year-old white male with a past medical history of uncontrolled type 2 diabetes mellitus came for followup.  Patient is accompanied by his wife and son.  Blood sugars are fluctuating from .  Majority of the blood sugars are in the 100 range.  Hypoglycemia.  Patient had very few episodes of hypoglycemia with a blood sugar dropping into 70s or 80s.  Uncontrolled type 2 diabetes mellitus with nephropathy and chronic kidney disease.  Patient is elevated creatinine.  Patient has followup with nephrology.  Patient denied any knowledge of diabetic retinopathy or neuropathy    Hypoglycemia  Significantly reduced episodes of hypoglycemia since adjusting insulin but patient's wife is still trying to adjust insulin to keep his blood sugars below 200       Interval History January 6 2017     SUMMARY  Patient was seen in April 2013 for the first time and was advised to continue Lantus 12 units in the morning and Humalog scale of 1 unit for every 20 carbs  Patient was accompanied by his wife . They both mentioned that his hypoglycemic episodes have decreased significantly even though he still has them at least once or twice a week. But they do not appear to be as severe as they were before the lowest reading they recall is 50  patient was given a plan for 3 units of NovoLog if the blood sugars more than 100 before meal,  And 2 units of NovoLog if the blood sugars less than 100 before meals.  Patient and family report that he has had significant success with  this plan. He was recently sick and was admitted in the hospital and had a few blood sugars that are in the 200-300 range.but otherwise his blood sugars have been significantly improving and mostly below 200.  the only time his blood sugars are elevated are usually after dinner  Patient denied any knowledge of diabetic retinopathy.  Patient denied any symptoms of diabetic neuropathy.  Diabetic nephropathy  Patient has diabetic nephropathy and chronic kidney disease with elevated creatinine of 1.57  Wife reported that he had a small friction area the bottom of the stump but it is healing well. He is currently seeing Dr. Smith [facility].  Hypoglycemia  Patient has had episodes of hypoglycemia but usually does a good explanation. He has had to early-morning hypoglycemic episodes which are rather unclear. Patient's wife reports that he feels blood sugars less than 200 and he does not eat any snack at night then his blood sugar been low at next morning   Interval History  Historian is patient's wife  She reported that he had a few hypoglycemic episodes but not nearly as many as in the previous visit.  Most of the hypoglycemia appears to be before dinnertime.  Patient blood sugars are still fluctuating from        The following portions of the patient's history were reviewed and updated as appropriate: allergies, current medications, past family history, past medical history, past social history, past surgical history and problem list.    Past Medical History:   Diagnosis Date   • Abnormal electrocardiogram     chronic ST depression, diffusely   • Acute peritonitis     with intestinal perforation after colonscopy, s/p multiple abdominal surgeries to repair it   • Anxiety disorder due to general medical condition    • Chronic atrial fibrillation    • Chronic kidney disease    • Coronary artery disease     has known 100% RCA going back to 2003 with normal LAD, had PTCA and stenting of  the ostial/proxLCx in 2003, cath in 1/2008 with restenosis, s/p 3*30mm BMS at that time; nonoischemic Cardiolite 10/2012 (with inferior infarct)   • Depression    • Diabetes mellitus type 2, uncontrolled     with neurologic complication, and renal manifestation   • Diastolic congestive heart failure    • Dyslipidemia    • Esophageal reflux    • Hyperinsulinism    • Long-term insulin use    • Non-allergic vasomotor rhinitis     with eustation tube dysfunction   • Osteoarthritis    • Osteomyelitis     RLE, s/p BKA 8/2014   • Otitis media of right ear    • PAD (peripheral artery disease)     f/b Dr. Cooper, s/p RLE BKA   • Pulmonary hypertension     RHC 1/2008 with mean PA 50mm Hg, wedge 23 mm Hg; secondary to diastolic dysfunction   • Renal artery stenosis     s/p prior bilateral stenting in [place], now f/b Dr. Cooper   • Varicella zoster      Family History   Problem Relation Age of Onset   • Cancer Brother    • Heart attack Brother      acute MI     Social History     Social History   • Marital status:      Spouse name: N/A   • Number of children: N/A   • Years of education: N/A     Occupational History   • Not on file.     Social History Main Topics   • Smoking status: Never Smoker   • Smokeless tobacco: Not on file   • Alcohol use No   • Drug use: Not on file   • Sexual activity: Not on file     Other Topics Concern   • Not on file     Social History Narrative     Allergies   Allergen Reactions   • Codeine    • Meperidine    • Tape        Current Outpatient Prescriptions:   •  ALPRAZolam (XANAX) 0.25 MG tablet, Take 1 tablet by mouth 2 (Two) Times a Day As Needed for anxiety or sleep., Disp: 30 tablet, Rfl: 5  •  aspirin 81 MG tablet, Take  by mouth daily., Disp: , Rfl:   •  citalopram (CeleXA) 20 MG tablet, Take 1 tablet by mouth Daily., Disp: 90 tablet, Rfl: 3  •  finasteride (PROSCAR) 5 MG tablet, Take  by mouth daily., Disp: , Rfl:   •  fluticasone (FLONASE) 50 MCG/ACT nasal spray, into each nostril 2  "(two) times a day., Disp: , Rfl:   •  furosemide (LASIX) 20 MG tablet, TAKE 1 TABLET DAILY AS DIRECTED, Disp: 90 tablet, Rfl: 1  •  glucose blood (ONE TOUCH ULTRA TEST) test strip, 1 each by Other route 5 (five) times a day. TO TEST BLOOD SUGARS 5 TIMES DAILY, Disp: 450 each, Rfl: 3  •  Insulin Glargine 100 UNIT/ML solution pen-injector, Inject  under the skin., Disp: , Rfl:   •  insulin lispro (HumaLOG) 100 UNIT/ML injection, Inject  under the skin 3 (three) times a day., Disp: , Rfl:   •  loratadine (CLARITIN) 10 MG tablet, Take  by mouth., Disp: , Rfl:   •  lovastatin (MEVACOR) 40 MG tablet, TAKE 1 TABLET DAILY AS DIRECTED, Disp: 90 tablet, Rfl: 0  •  metoprolol tartrate (LOPRESSOR) 25 MG tablet, TAKE ONE HALF TABLET EVERY OTHER DAY OR AS DIRECTED, Disp: 35 tablet, Rfl: 1  •  mupirocin (BACTROBAN) 2 % ointment, , Disp: , Rfl:   •  pantoprazole (PROTONIX) 40 MG EC tablet, Take 1 tablet by mouth Daily., Disp: 90 tablet, Rfl: 0  •  PRADAXA 75 MG capsule, TAKE 1 CAPSULE TWICE DAILY, Disp: 180 capsule, Rfl: 3  •  vitamin D (ERGOCALCIFEROL) 15878 UNITS capsule capsule, , Disp: , Rfl:         Review of Systems   Constitutional: Negative for chills, fatigue and fever.   Cardiovascular: Negative for chest pain and palpitations.   Gastrointestinal: Negative for abdominal pain, constipation, diarrhea and nausea.   Endocrine: Negative for cold intolerance and heat intolerance.   All other systems reviewed and are negative.      Objective       Vitals:    04/11/17 1347   BP: 128/82   Pulse: 72   SpO2: 91%   Weight: 162 lb (73.5 kg)   Height: 71\" (180.3 cm)     Body mass index is 22.59 kg/(m^2).      Physical Exam   Constitutional: He is oriented to person, place, and time. He appears well-developed and well-nourished.   HENT:   Head: Normocephalic and atraumatic.   Eyes: EOM are normal. Pupils are equal, round, and reactive to light.   Neck: Normal range of motion. Neck supple. No thyromegaly present.   Cardiovascular: Normal " rate, regular rhythm, normal heart sounds and intact distal pulses.    Pulmonary/Chest: Effort normal and breath sounds normal.   Abdominal: Soft. Bowel sounds are normal. He exhibits no distension. There is no tenderness.   Musculoskeletal: Normal range of motion. He exhibits no edema.   Right below-knee amputation   Neurological: He is alert and oriented to person, place, and time.   Skin: Skin is warm and dry.   Psychiatric: He has a normal mood and affect. His behavior is normal.   Nursing note and vitals reviewed.    Results Review:     I reviewed the patient's new clinical results.    Medical records reviewed  Summary:      Office Visit on 01/06/2017   Component Date Value Ref Range Status   • Glucose 01/06/2017 213* 65 - 99 mg/dL Final   • BUN 01/06/2017 21  8 - 23 mg/dL Final   • Creatinine 01/06/2017 1.52* 0.76 - 1.27 mg/dL Final   • eGFR Non African Am 01/06/2017 44* >60 mL/min/1.73 Final    Comment: The MDRD GFR formula is only valid for adults with stable  renal function between ages 18 and 70.     • eGFR  Am 01/06/2017 53* >60 mL/min/1.73 Final   • BUN/Creatinine Ratio 01/06/2017 13.8  7.0 - 25.0 Final   • Sodium 01/06/2017 145  136 - 145 mmol/L Final   • Potassium 01/06/2017 5.1  3.5 - 5.2 mmol/L Final   • Chloride 01/06/2017 104  98 - 107 mmol/L Final   • Total CO2 01/06/2017 26.8  22.0 - 29.0 mmol/L Final   • Calcium 01/06/2017 9.4  8.6 - 10.5 mg/dL Final   • Total Protein 01/06/2017 6.5  6.0 - 8.5 g/dL Final   • Albumin 01/06/2017 3.80  3.50 - 5.20 g/dL Final   • Globulin 01/06/2017 2.7  gm/dL Final   • A/G Ratio 01/06/2017 1.4  g/dL Final   • Total Bilirubin 01/06/2017 0.4  0.1 - 1.2 mg/dL Final   • Alkaline Phosphatase 01/06/2017 80  39 - 117 U/L Final   • AST (SGOT) 01/06/2017 16  1 - 40 U/L Final   • ALT (SGPT) 01/06/2017 13  1 - 41 U/L Final   • Hemoglobin A1C 01/06/2017 7.50* 4.80 - 5.60 % Final    Comment: Hemoglobin A1C Ranges:  Increased Risk for Diabetes  5.7% to 6.4%  Diabetes                      >= 6.5%  Diabetic Goal                < 7.0%       Lab Results   Component Value Date    HGBA1C 7.50 (H) 01/06/2017    HGBA1C 7.83 (H) 07/01/2016    HGBA1C 9.0 (H) 12/22/2015     Lab Results   Component Value Date    CREATININE 1.52 (H) 01/06/2017     Imaging Results (most recent)     None                Assessment and Plan:    Santy was seen today for diabetes.    Diagnoses and all orders for this visit:    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  -     Comprehensive Metabolic Panel  -     Hemoglobin A1c  -     TSH  -     Microalbumin / Creatinine Urine Ratio    Uncontrolled type 2 diabetes mellitus with diabetic neuropathy, with long-term current use of insulin  -     Comprehensive Metabolic Panel  -     Hemoglobin A1c  -     TSH  -     Microalbumin / Creatinine Urine Ratio    Uncontrolled type 2 diabetes mellitus with complication, with long-term current use of insulin    Encounter for long-term (current) use of insulin    Hyperinsulinism        #1 uncontrolled type II diabetes mellitus. Patient is very brittle and fluctuate wildly. His a.m. blood sugars are generally good; nighttime blood sugars are in the 200-300 range.   #2 diabetic nephropathy with elevated creatinine. Patient is currently seeing nephrologist Dr. Avery.  #3 hypoglycemia the incidence of hypoglycemia has come down significantly  Family reports that he is not having as many low episodes, maybe on average once a week./month  #4 diabetic neuropathy:       Status post right below-knee amputation and prosthesis      Considering his age of 84 years,,fear of hypoglycemia and other comorbidities, I recommend that his optimal hemoglobin A1c range would be 7.5% to 8.5%.  patient's last hemoglobin A1c was 7.8% in Sept 2015; 9% in Dec 2015    Patient's glucose log reviewed most of the blood sugars are between  range  Very few are over 300 and very few are less than 100  Patient is currently taking Lantus 14 units  every morning  Mealtime Humalog  It the blood sugars below 200 then he gets 6 units for the meal  The blood sugars greater than 200 then he gets 8 units for the meal  I also recommend that he may not get more than 4 units at lunch  Bedtime  He the blood sugars less than 300 he gets no insulin  If the blood sugars greater than 300 he gets 2 units  If the blood sugars greater than 400 he gets 3 units    Patient's wife reported that she is following the instructions. Very strictly.  Patient had very few episodes of hypoglycemia in the past 3-6 months.  Patient will get lab testing done today.  He will return to follow up in 3-4 months    The total time spent for old record and lab review and face- to- face was more than 25 min of which greater than 50% of time was spent on counseling the patient on recommended evaluation and treatment options, instructions for management/treatment and /or follow up  and importance of compliance with chosen management or treatment options        Deandre Stewart MD. FACE    04/11/17      EMR Dragon / transcription disclaimer:    Much of this encounter note is an electronic transcription/ translation of spoken language to printed text.  Electronic translation of spoken language may permit erroneous, or at times, nonsensical words or phrases to be inadvertently transcribed; although I have reviewed the note for such errors, some may still exist.

## 2017-04-11 NOTE — TELEPHONE ENCOUNTER
Called and discussed with pt's wife she gave verbal understanding.  Pt has not been advised to stop any medications.

## 2017-10-10 NOTE — PROGRESS NOTES
85 y.o.    Patient Care Team:  Wilmer Parada MD as PCP - General (Family Medicine)  Tom Montaño MD as PCP - Claims Attributed  Niranjan Krueger MD as Consulting Physician (Cardiology)  Deandre JASON MD as Consulting Physician (Endocrinology)    Chief Complaint:      F/U TYPE 2 DIABETES, UNCONTROLLED.  Subjective     HPI patient is a 85-year-old white male with a past history of uncontrolled type 2 diabetes mellitus with complications came for follow-up  Patient is accompanied by his wife    Uncontrolled type 2 diabetes mellitus  Wife reports that patient has become slightly noncompliant with his diet  His blood sugars are ranging in the 150-400 range in the past 3 months  She suspects that his hemoglobin A1c may be high year  Hypoglycemia   patient obviously had no hypoglycemia in the past 3 months since the blood sugars were elevated  Uncontrolled type 2 diabetes mellitus with nephropathy  Patient is chronic kidney disease and elevated creatinine  He has follow-up with nephrology.  Patient denies any symptoms or knowledge of diabetic retinopathy or neuropathy.         Interval History January 6 2017      SUMMARY  Patient was seen in April 2013 for the first time and was advised to continue Lantus 12 units in the morning and Humalog scale of 1 unit for every 20 carbs  Patient was accompanied by his wife . They both mentioned that his hypoglycemic episodes have decreased significantly even though he still has them at least once or twice a week. But they do not appear to be as severe as they were before the lowest reading they recall is 50  patient was given a plan for 3 units of NovoLog if the blood sugars more than 100 before meal,  And 2 units of NovoLog if the blood sugars less than 100 before meals.  Patient and family report that he has had significant success with this plan. He was recently sick and was admitted in the hospital and had a few blood sugars that are in the 200-300 range.but  otherwise his blood sugars have been significantly improving and mostly below 200.  the only time his blood sugars are elevated are usually after dinner  Patient denied any knowledge of diabetic retinopathy.  Patient denied any symptoms of diabetic neuropathy.  Diabetic nephropathy  Patient has diabetic nephropathy and chronic kidney disease with elevated creatinine of 1.57  Wife reported that he had a small friction area the bottom of the stump but it is healing well. He is currently seeing Dr. Smith [facility].  Hypoglycemia  Patient has had episodes of hypoglycemia but usually does a good explanation. He has had to early-morning hypoglycemic episodes which are rather unclear. Patient's wife reports that he feels blood sugars less than 200 and he does not eat any snack at night then his blood sugar been low at next morning   Interval History  Historian is patient's wife  She reported that he had a few hypoglycemic episodes but not nearly as many as in the previous visit.  Most of the hypoglycemia appears to be before dinnertime.  Patient blood sugars are still fluctuating from      The following portions of the patient's history were reviewed and updated as appropriate: allergies, current medications, past family history, past medical history, past social history, past surgical history and problem list.    Past Medical History:   Diagnosis Date   • Abnormal electrocardiogram     chronic ST depression, diffusely   • Acute peritonitis     with intestinal perforation after colonscopy, s/p multiple abdominal surgeries to repair it   • Anxiety disorder due to general medical condition    • Chronic atrial fibrillation    • Chronic kidney disease    • Coronary artery disease     has known 100% RCA going back to 2003 with normal LAD, had PTCA and stenting of the ostial/proxLCx in 2003, cath in 1/2008 with restenosis, s/p 3*30mm BMS at that time; nonoischemic Cardiolite 10/2012 (with  inferior infarct)   • Depression    • Diabetes mellitus type 2, uncontrolled     with neurologic complication, and renal manifestation   • Diastolic congestive heart failure    • Dyslipidemia    • Esophageal reflux    • Hyperinsulinism    • Long-term insulin use    • Non-allergic vasomotor rhinitis     with eustation tube dysfunction   • Osteoarthritis    • Osteomyelitis     RLE, s/p BKA 8/2014   • Otitis media of right ear    • PAD (peripheral artery disease)     f/b Dr. Cooper, s/p RLE BKA   • Pulmonary hypertension     RHC 1/2008 with mean PA 50mm Hg, wedge 23 mm Hg; secondary to diastolic dysfunction   • Renal artery stenosis     s/p prior bilateral stenting in [place], now f/b Dr. Cooper   • Varicella zoster      Family History   Problem Relation Age of Onset   • Cancer Brother    • Heart attack Brother      acute MI     Social History     Social History   • Marital status:      Spouse name: N/A   • Number of children: N/A   • Years of education: N/A     Occupational History   • Not on file.     Social History Main Topics   • Smoking status: Never Smoker   • Smokeless tobacco: Not on file   • Alcohol use No   • Drug use: Not on file   • Sexual activity: Not on file     Other Topics Concern   • Not on file     Social History Narrative     Allergies   Allergen Reactions   • Codeine    • Meperidine    • Tape        Current Outpatient Prescriptions:   •  ALPRAZolam (XANAX) 0.25 MG tablet, TAKE ONE TABLET BY MOUTH TWICE DAILY AS NEEDED FOR ANXIETY FOR SLEEP, Disp: 30 tablet, Rfl: 0  •  aspirin 81 MG tablet, Take  by mouth daily., Disp: , Rfl:   •  citalopram (CeleXA) 20 MG tablet, Take 1 tablet by mouth Daily., Disp: 90 tablet, Rfl: 3  •  finasteride (PROSCAR) 5 MG tablet, Take  by mouth daily., Disp: , Rfl:   •  fluticasone (FLONASE) 50 MCG/ACT nasal spray, into each nostril 2 (two) times a day., Disp: , Rfl:   •  furosemide (LASIX) 20 MG tablet, TAKE 1 TABLET DAILY AS DIRECTED, Disp: 90 tablet, Rfl: 0  •   "glucose blood (ONE TOUCH ULTRA TEST) test strip, 1 each by Other route 5 (five) times a day. TO TEST BLOOD SUGARS 5 TIMES DAILY, Disp: 450 each, Rfl: 3  •  HUMALOG KWIKPEN 100 UNIT/ML solution pen-injector, INJECT 10 UNITS UNDER THE SKIN THREE TIMES A DAY, Disp: 30 mL, Rfl: 1  •  Insulin Glargine 100 UNIT/ML solution pen-injector, Inject  under the skin., Disp: , Rfl:   •  insulin lispro (HumaLOG) 100 UNIT/ML injection, Inject  under the skin 3 (three) times a day., Disp: , Rfl:   •  loratadine (CLARITIN) 10 MG tablet, Take  by mouth., Disp: , Rfl:   •  lovastatin (MEVACOR) 40 MG tablet, TAKE 1 TABLET DAILY AS DIRECTED, Disp: 90 tablet, Rfl: 0  •  metoprolol tartrate (LOPRESSOR) 25 MG tablet, TAKE ONE-HALF TABLET BY MOUTH EVERY OTHER DAY OR  AS  DIRECTED, Disp: 35 tablet, Rfl: 3  •  pantoprazole (PROTONIX) 40 MG EC tablet, TAKE 1 TABLET DAILY, Disp: 90 tablet, Rfl: 0  •  PRADAXA 75 MG capsule, TAKE 1 CAPSULE TWICE DAILY, Disp: 180 capsule, Rfl: 3  •  vitamin D (ERGOCALCIFEROL) 28128 UNITS capsule capsule, , Disp: , Rfl:         Review of Systems   Constitutional: Negative for chills, fatigue and fever.   Cardiovascular: Negative for chest pain and palpitations.   Gastrointestinal: Negative for abdominal pain, constipation, diarrhea, nausea and vomiting.   Endocrine: Positive for cold intolerance and heat intolerance.   All other systems reviewed and are negative.      Objective       Vitals:    10/10/17 1418   BP: 122/64   Pulse: 67   Weight: 158 lb (71.7 kg)   Height: 71\" (180.3 cm)     Body mass index is 22.04 kg/(m^2).      Physical Exam   Constitutional: He is oriented to person, place, and time. He appears well-developed and well-nourished.   HENT:   Head: Normocephalic and atraumatic.   Eyes: EOM are normal. Pupils are equal, round, and reactive to light.   Neck: Normal range of motion. Neck supple. No thyromegaly present.   Cardiovascular: Normal rate, regular rhythm, normal heart sounds and intact distal " pulses.    Pulmonary/Chest: Effort normal and breath sounds normal.   Abdominal: Soft. Bowel sounds are normal. He exhibits no distension. There is no tenderness.   Musculoskeletal: Normal range of motion. He exhibits no edema.   Right below-knee amputation   Neurological: He is alert and oriented to person, place, and time.   Skin: Skin is warm and dry.   Psychiatric: He has a normal mood and affect. His behavior is normal.   Nursing note and vitals reviewed.    Results Review:     I reviewed the patient's new clinical results.    Medical records reviewed  Summary:      Lab on 04/28/2017   Component Date Value Ref Range Status   • Phosphorus 04/28/2017 3.3  2.7 - 4.5 mg/dL Final   • PTH, Intact 04/28/2017 94.5* 15.0 - 65.0 pg/mL Final   • 25 Hydroxy, Vitamin D 04/28/2017 45.4  ng/ml Final   • WBC 04/28/2017 10.36  4.80 - 10.80 10*3/mm3 Final   • RBC 04/28/2017 4.43* 4.70 - 6.10 10*6/mm3 Final   • Hemoglobin 04/28/2017 13.0* 14.0 - 18.0 g/dL Final   • Hematocrit 04/28/2017 41.1* 42.0 - 52.0 % Final   • MCV 04/28/2017 92.8  80.0 - 94.0 fL Final   • MCH 04/28/2017 29.3  27.0 - 31.0 pg Final   • MCHC 04/28/2017 31.6  31.0 - 37.0 g/dL Final   • RDW 04/28/2017 14.2  11.5 - 14.5 % Final   • RDW-SD 04/28/2017 48.2  37.0 - 54.0 fl Final   • MPV 04/28/2017 11.0* 7.4 - 10.4 fL Final   • Platelets 04/28/2017 199  140 - 500 10*3/mm3 Final   • Neutrophil % 04/28/2017 76.3* 45.0 - 70.0 % Final   • Lymphocyte % 04/28/2017 13.4* 20.0 - 45.0 % Final   • Monocyte % 04/28/2017 6.0  3.0 - 8.0 % Final   • Eosinophil % 04/28/2017 3.2  0.0 - 4.0 % Final   • Basophil % 04/28/2017 0.7  0.0 - 2.0 % Final   • Immature Grans % 04/28/2017 0.4  0.0 - 0.5 % Final   • Neutrophils, Absolute 04/28/2017 7.91  1.50 - 8.30 10*3/mm3 Final   • Lymphocytes, Absolute 04/28/2017 1.39  0.60 - 4.80 10*3/mm3 Final   • Monocytes, Absolute 04/28/2017 0.62  0.00 - 1.00 10*3/mm3 Final   • Eosinophils, Absolute 04/28/2017 0.33* 0.10 - 0.30 10*3/mm3 Final   •  Basophils, Absolute 04/28/2017 0.07  0.00 - 0.20 10*3/mm3 Final   • Immature Grans, Absolute 04/28/2017 0.04* 0.00 - 0.03 10*3/mm3 Final   • nRBC 04/28/2017 0.0  0.0 - 0.0 /100 WBC Final   • Color, UA 04/28/2017 Yellow  Yellow, Straw Final   • Appearance, UA 04/28/2017 Clear  Clear Final   • pH, UA 04/28/2017 <=5.0  4.5 - 8.0 Final   • Specific Gravity, UA 04/28/2017 1.025  1.003 - 1.030 Final   • Glucose, UA 04/28/2017 Negative  Negative Final   • Ketones, UA 04/28/2017 Trace* Negative, 80 mg/dL (3+), >=160 mg/dL (4+) Final   • Bilirubin, UA 04/28/2017 Small (1+)* Negative Final   • Blood, UA 04/28/2017 Trace* Negative Final   • Protein, UA 04/28/2017 100 mg/dL (2+)* Negative Final   • Leuk Esterase, UA 04/28/2017 Negative  Negative Final   • Nitrite, UA 04/28/2017 Negative  Negative Final   • Urobilinogen, UA 04/28/2017 1.0 E.U./dL  0.2 - 1.0 E.U./dL Final   • RBC, UA 04/28/2017 0-2* None Seen /HPF Final   • WBC, UA 04/28/2017 0-2* None Seen /HPF Final   • Bacteria, UA 04/28/2017 None Seen  None Seen /HPF Final   • Squamous Epithelial Cells, UA 04/28/2017 0-2  None Seen, 0-2 /HPF Final   • Hyaline Casts, UA 04/28/2017 0-2  None Seen /LPF Final   • Methodology 04/28/2017 Manual Light Microscopy   Final     Lab Results   Component Value Date    HGBA1C 8.43 (H) 04/11/2017    HGBA1C 7.50 (H) 01/06/2017    HGBA1C 7.83 (H) 07/01/2016     Lab Results   Component Value Date    MICROALBUR 31.4 04/11/2017    CREATININE 1.25 04/11/2017     Imaging Results (most recent)     None                Assessment and Plan:    Santy was seen today for diabetes.    Diagnoses and all orders for this visit:    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  -     Comprehensive Metabolic Panel  -     Hemoglobin A1c  -     TSH  -     Microalbumin / Creatinine Urine Ratio - Urine, Clean Catch    Uncontrolled type 2 diabetes mellitus with complication, with long-term current use of insulin    Uncontrolled type 2 diabetes mellitus  with diabetic neuropathy, with long-term current use of insulin    Hyperinsulinism    Encounter for long-term (current) use of insulin    Chronic kidney disease, unspecified CKD stage              #1 uncontrolled type II diabetes mellitus. Patient is very brittle and fluctuate wildly. His a.m. blood sugars are generally good; nighttime blood sugars are in the 200-300 range.   #2 diabetic nephropathy with elevated creatinine. Patient is currently seeing nephrologist Dr. Avery.  #3 hypoglycemia the incidence of hypoglycemia has come down significantly  Family reports that he is not having as many low episodes, maybe on average once a week./month  #4 diabetic neuropathy:       Status post right below-knee amputation and prosthesis      Considering his age of 84 years,,fear of hypoglycemia and other comorbidities, I recommend that his optimal hemoglobin A1c range would be 7.5% to 8.5%.  patient's last hemoglobin A1c was 7.8% in Sept 2015; 9% in Dec 2015       Patient's glucose log reviewed  Most of the blood sugars are in the 100-400 range higher than before  He had very few blood sugars below 100  No recent hypoglycemia     Patient is advised to increase the Lantus to 16 units daily in the morning    Mealtime Humalog  It the blood sugars below 200 then he gets 6 units for the meal  The blood sugars greater than 200 then he gets 10 units for the meal  I also recommend that he may not get more than 6 units at lunch    Bedtime  He the blood sugars less than 250 he gets no insulin  If the blood sugars are 250- 300 he gets 2 units  If the blood sugar is 301-350 he gets 3 units  If the blood sugars greater than 350 he gets 4 units    Patient's wife reported that she is following the instructions. Very strictly.  Patient had very few episodes of hypoglycemia in the past 3-6 months.  Patient will get lab testing done today.  He will return to follow up in 3-4 months     The total time spent for old record and lab review and  "face- to- face was more than 25 min of which greater than 15 min of time was spent on counseling the patient on recommended evaluation and treatment options, instructions for management/treatment and /or follow up  and importance of compliance with chosen management or treatment options  Deandre Stewart MD. FACE    10/10/17      EMR Dragon / transcription disclaimer:     \"Dictated utilizing Dragon dictation\".         "

## 2017-10-10 NOTE — PATIENT INSTRUCTIONS
Patient is advised to increase the Lantus to 16 units daily in the morning    Mealtime Humalog  It the blood sugars below 200 then he gets 6 units for the meal  The blood sugars greater than 200 then he gets 10 units for the meal  I also recommend that he may not get more than 6 units at lunch    Bedtime  He the blood sugars less than 250 he gets no insulin  If the blood sugars are 250- 300 he gets 2 units  If the blood sugar is 301-350 he gets 3 units  If the blood sugars greater than 350 he gets 4 units

## 2017-11-10 PROBLEM — F01.50 VASCULAR DEMENTIA WITHOUT BEHAVIORAL DISTURBANCE (HCC): Status: ACTIVE | Noted: 2017-01-01

## 2017-11-10 PROBLEM — R12 HEARTBURN: Status: ACTIVE | Noted: 2017-01-01

## 2017-11-10 PROBLEM — N25.81 HYPERPARATHYROIDISM, SECONDARY RENAL (HCC): Status: ACTIVE | Noted: 2017-01-01

## 2017-11-10 NOTE — PROGRESS NOTES
"  Chief Complaint   Patient presents with   • Follow-up   • Diabetes       Subjective   Santy Quiroz is an 85 y.o. male who presents for follow up of diabetes. Current symptoms include: {dm sx:63629}. Patient denies {dm sx:55460}. Evaluation to date has included: {dm labs:1983746050}. Home sugars: {dm home sugars:36633}. Current treatments: {dm interventions:83657}. Last dilated eye exam ***.    {Common ambulatory SmartLinks:36345}        Review of Systems  {ros; complete:14087}     Vitals:    11/10/17 1008   BP: 90/52   BP Location: Left arm   Patient Position: Sitting   Pulse: 76   Temp: 97.5 °F (36.4 °C)   SpO2: 95%   Weight: 156 lb (70.8 kg)   Height: 71\" (180.3 cm)       Objective    Gen:  Alert, pleasant  Ears: canals clear, TMs normal  Throat: clear , no thrush, teeth ok  Neck: no bruit, no LAD  Lungs: clear  Heart: RR no murmur  Feet:  No rash, no skin breakdown, sensation grossly normal.    Laboratory:  Results for orders placed or performed in visit on 10/25/17   Basic Metabolic Panel   Result Value Ref Range    Glucose 221 (H) 65 - 99 mg/dL    BUN 27 (H) 8 - 23 mg/dL    Creatinine 1.55 (H) 0.76 - 1.27 mg/dL    Sodium 144 136 - 145 mmol/L    Potassium 4.5 3.5 - 5.2 mmol/L    Chloride 105 98 - 107 mmol/L    CO2 21.5 (L) 22.0 - 29.0 mmol/L    Calcium 8.6 (L) 8.8 - 10.5 mg/dL    eGFR Non African Amer 43 (L) >60 mL/min/1.73    BUN/Creatinine Ratio 17.4 7.0 - 25.0    Anion Gap 17.5 mmol/L   Phosphorus   Result Value Ref Range    Phosphorus 3.3 2.7 - 4.5 mg/dL   PTH, Intact   Result Value Ref Range    PTH, Intact 171.0 (H) 15.0 - 65.0 pg/mL   Vitamin D 25 Hydroxy   Result Value Ref Range    25 Hydroxy, Vitamin D 34.6 ng/ml   Urinalysis With / Culture If Indicated - Urine, Clean Catch   Result Value Ref Range    Color, UA Yellow Yellow, Straw    Appearance, UA Clear Clear    pH, UA <=5.0 4.5 - 8.0    Specific Gravity, UA 1.025 1.003 - 1.030    Glucose,  mg/dL (Trace) (A) Negative    Ketones, UA " Negative Negative, 80 mg/dL (3+), >=160 mg/dL (4+)    Bilirubin, UA Negative Negative    Blood, UA Negative Negative    Protein,  mg/dL (2+) (A) Negative    Leuk Esterase, UA Negative Negative    Nitrite, UA Negative Negative    Urobilinogen, UA 0.2 E.U./dL 0.2 - 1.0 E.U./dL   CBC Auto Differential   Result Value Ref Range    WBC 8.71 4.80 - 10.80 10*3/mm3    RBC 4.14 (L) 4.70 - 6.10 10*6/mm3    Hemoglobin 12.8 (L) 14.0 - 18.0 g/dL    Hematocrit 40.5 (L) 42.0 - 52.0 %    MCV 97.8 (H) 80.0 - 94.0 fL    MCH 30.9 27.0 - 31.0 pg    MCHC 31.6 31.0 - 37.0 g/dL    RDW 14.8 (H) 11.5 - 14.5 %    RDW-SD 53.6 37.0 - 54.0 fl    MPV 10.9 (H) 7.4 - 10.4 fL    Platelets 210 140 - 500 10*3/mm3    Neutrophil % 76.3 (H) 45.0 - 70.0 %    Lymphocyte % 11.3 (L) 20.0 - 45.0 %    Monocyte % 7.6 3.0 - 8.0 %    Eosinophil % 3.2 0.0 - 4.0 %    Basophil % 1.0 0.0 - 2.0 %    Immature Grans % 0.6 (H) 0.0 - 0.5 %    Neutrophils, Absolute 6.65 1.50 - 8.30 10*3/mm3    Lymphocytes, Absolute 0.98 0.60 - 4.80 10*3/mm3    Monocytes, Absolute 0.66 0.00 - 1.00 10*3/mm3    Eosinophils, Absolute 0.28 0.10 - 0.30 10*3/mm3    Basophils, Absolute 0.09 0.00 - 0.20 10*3/mm3    Immature Grans, Absolute 0.05 (H) 0.00 - 0.03 10*3/mm3    nRBC 0.0 0.0 - 0.0 /100 WBC   Urinalysis, Microscopic Only - Urine, Clean Catch   Result Value Ref Range    RBC, UA 6-12 (A) None Seen /HPF    WBC, UA 3-5 (A) None Seen /HPF    Bacteria, UA Trace (A) None Seen /HPF    Squamous Epithelial Cells, UA 3-6 (A) None Seen, 0-2 /HPF    Hyaline Casts, UA 3-6 None Seen /LPF    Methodology Manual Light Microscopy         Assessment/Plan        {dm tx plan:06559}    There are no diagnoses linked to this encounter.    No Follow-up on file.  There are no Patient Instructions on file for this visit.  There are no discontinued medications.      Dr. Wilmer Parada MD  [place], [place]  [facility].

## 2017-11-10 NOTE — PROGRESS NOTES
Subjective   Santy Quiroz is a 85 y.o. male who is here for   Chief Complaint   Patient presents with   • Follow-up   • Anxiety   • Hypertension   .     History of Present Illness   Anxiety has been stable    But BP has been low with standing up  On BB for his A Fib for rate control. But only taking 12.5 of Toprol a day, but still light headed when standing.   On anti coag for life    His right leg prosthesis is doing well.    [facility]es [facility]    Needs refills    S/w his wife and daughter in the room      The following portions of the patient's history were reviewed and updated as appropriate: allergies, current medications, past family history, past medical history, past social history, past surgical history and problem list.    Review of Systems    Objective   Physical Exam   Constitutional: He appears well-developed and well-nourished. No distress.   Cardiovascular: Normal rate.    Pulmonary/Chest: Effort normal.   Neurological: He is alert.   Psychiatric: He has a normal mood and affect.   Nursing note and vitals reviewed.      Assessment/Plan   Santy was seen today for follow-up, anxiety and hypertension.    Diagnoses and all orders for this visit:    Anxiety  -     citalopram (CeleXA) 20 MG tablet; Take 1 tablet by mouth Daily.  -     ALPRAZolam (XANAX) 0.25 MG tablet; Take 1 tablet by mouth 2 (Two) Times a Day As Needed for Anxiety or Sleep.    Chronic atrial fibrillation  -     carvedilol (COREG) 3.125 MG tablet; Take 1 tablet by mouth Daily With Breakfast. 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 Indications: Heartburn    Dyslipidemia  -     lovastatin (MEVACOR) 40 MG tablet; Take 1 tablet by mouth Every Night.      Patient Instructions   Lets hold Toprol for now and try coreg at 3 mg in am until he sees Dr mccormack on Dec 19th to see if his  lightheadedness is better.      Medications Discontinued During This Encounter   Medication Reason   • lovastatin (MEVACOR) 40 MG tablet Reorder   • citalopram (CeleXA) 20 MG tablet Reorder   • ALPRAZolam (XANAX) 0.25 MG tablet Reorder        Return in about 6 months (around 5/10/2018).    Dr. Wilmer Parada  [place], [place]

## 2017-11-14 NOTE — PATIENT INSTRUCTIONS
Lets hold Toprol for now and try coreg at 3 mg in am until he sees Dr mccormack on Dec 19th to see if his lightheadedness is better.

## 2017-11-27 NOTE — TELEPHONE ENCOUNTER
Pts daughter called and said that pt is having a hard time with hemorrhoids. She said they are bleeding a lot. Would like to know if you could give him something to put on them.

## 2017-11-29 ENCOUNTER — OFFICE (OUTPATIENT)
Dept: URBAN - METROPOLITAN AREA OTHER 6 | Facility: OTHER | Age: 82
End: 2017-11-29

## 2017-11-29 VITALS
HEART RATE: 82 BPM | HEIGHT: 71 IN | DIASTOLIC BLOOD PRESSURE: 50 MMHG | SYSTOLIC BLOOD PRESSURE: 116 MMHG | WEIGHT: 160 LBS

## 2017-11-29 DIAGNOSIS — K59.00 CONSTIPATION, UNSPECIFIED: ICD-10-CM

## 2017-11-29 DIAGNOSIS — K64.8 OTHER HEMORRHOIDS: ICD-10-CM

## 2017-11-29 PROCEDURE — 99202 OFFICE O/P NEW SF 15 MIN: CPT

## 2017-11-29 RX ORDER — HYDROCORTISONE ACETATE 25 MG/1
25 SUPPOSITORY RECTAL
Qty: 14 | Refills: 6 | Status: ACTIVE
Start: 2017-11-29

## 2017-12-19 NOTE — PROGRESS NOTES
Date of Office Visit: 2017  Encounter Provider: Niranjan Krueger MD  Place of Service: [facility] CARDIOLOGY  Patient Name: Santy Quiroz  :1932    Chief Complaint   Patient presents with   • Coronary Artery Disease     1 yr follow up    :     HPI: Santy Quiroz is a 85 y.o. male who presents today to follow-up. He has known coronary disease.  He has known chronic occlusion of the RCA (going back to ).  He underwent PTCA of the ostial/proximal LCx in , and he underwent BMS placement to the same lesion in .  A Cardiolite in  showed an inferior infarct but no ischemia.  He has permanent atrial fibrillation and is on extremely low dose metoprolol (one-half tablet every other day) and renally-dosed dabigatran. 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 He's also undergone previous renal artery stenting. He has significant chronic renal insufficiency and follows with Dr. Gautam Mendez.     He denies any cardiac symptoms. He has generalized fatigue which is unchanged.  His insurance company is requesting that he be switched from dabigatran to apixaban.     Past Medical History:   Diagnosis Date   • Abnormal electrocardiogram     chronic ST depression, diffusely   • Acute peritonitis     with intestinal perforation after colonscopy, s/p multiple abdominal surgeries to repair it   • Anxiety disorder due to general medical condition    • Chronic atrial fibrillation    • Chronic kidney disease    • Coronary artery disease     has known 100% RCA going back to  with normal LAD, had PTCA and stenting of the ostial/proxLCx in , cath in 2008 with restenosis, s/p 3*30mm BMS at that time; nonoischemic Cardiolite 10/2012 (with inferior infarct)   • Depression    • Diabetes mellitus type 2, uncontrolled     with neurologic complication, and renal manifestation   • Diastolic congestive heart failure    •  Dyslipidemia    • Esophageal reflux    • HAP (hospital-acquired pneumonia) 6/1/2014   • Hyperinsulinism    • Long-term insulin use    • Non-allergic vasomotor rhinitis     with eustation tube dysfunction   • Osteoarthritis    • Osteomyelitis     RLE, s/p BKA 8/2014   • Otitis media of right ear    • PAD (peripheral artery disease)     f/b Dr. Cooper, s/p RLE BKA   • Pulmonary hypertension     RHC 1/2008 with mean PA 50mm Hg, wedge 23 mm Hg; secondary to diastolic dysfunction   • Renal artery stenosis     s/p prior bilateral stenting in [place], now f/b Dr. Cooper   • Varicella zoster        Past Surgical History:   Procedure Laterality Date   • BELOW KNEE AMPUTATION Right    • FOOT ARTHROPLASTY     • GASTRIC RESTRICTION SURGERY         Social History     Social History   • Marital status:      Spouse name: N/A   • Number of children: N/A   • Years of education: N/A     Occupational History   • Not on file.     Social History Main Topics   • Smoking status: Never Smoker   • Smokeless tobacco: Never Used   • Alcohol use No   • Drug use: Not on file   • Sexual activity: Not on file     Other Topics Concern   • Not on file     Social History Narrative       Family History   Problem Relation Age of Onset   • Cancer Brother    • Heart attack Brother      acute MI       Review of Systems   Constitution: Positive for malaise/fatigue.   Cardiovascular: Negative for chest pain.   Respiratory: Positive for shortness of breath.    All other systems reviewed and are negative.      Allergies   Allergen Reactions   • Codeine    • Meperidine    • Tape          Current Outpatient Prescriptions:   •  ALPRAZolam (XANAX) 0.25 MG tablet, Take 1 tablet by mouth 2 (Two) Times a Day As Needed for Anxiety or Sleep., Disp: 30 tablet, Rfl: 5  •  aspirin 81 MG tablet, Take  by mouth daily., Disp: , Rfl:   •  citalopram (CeleXA) 20 MG tablet, Take 1 tablet by mouth Daily., Disp: 90 tablet, Rfl: 3  •  finasteride (PROSCAR) 5 MG tablet,  "Take  by mouth daily., Disp: , Rfl:   •  fluticasone (FLONASE) 50 MCG/ACT nasal spray, into each nostril 2 (two) times a day., Disp: , Rfl:   •  furosemide (LASIX) 20 MG tablet, Take 1 tablet by mouth Daily. OR AS DIRECTED, Disp: 90 tablet, Rfl: 3  •  glucose blood (ONE TOUCH ULTRA TEST) test strip, 1 each by Other route 5 (five) times a day. TO TEST BLOOD SUGARS 5 TIMES DAILY, Disp: 450 each, Rfl: 3  •  HUMALOG KWIKPEN 100 UNIT/ML solution pen-injector, INJECT 10 UNITS UNDER THE SKIN THREE TIMES A DAY, Disp: 30 mL, Rfl: 1  •  Insulin Glargine 100 UNIT/ML solution pen-injector, Inject  under the skin., Disp: , Rfl:   •  loratadine (CLARITIN) 10 MG tablet, Take  by mouth., Disp: , Rfl:   •  lovastatin (MEVACOR) 40 MG tablet, Take 1 tablet by mouth Every Night., Disp: 90 tablet, Rfl: 3  •  metoprolol tartrate (LOPRESSOR) 25 MG tablet, TAKE ONE HALF TABLET BY MOUTH EVERY OTHER DAY OR AS DIRECTED, Disp: 30 tablet, Rfl: 3  •  PRADAXA 75 MG capsule, TAKE 1 CAPSULE TWICE DAILY, Disp: 180 capsule, Rfl: 3  •  raNITIdine (ZANTAC) 150 MG tablet, Take 1 tablet by mouth 2 (Two) Times a Day. Indications: Heartburn, Disp: 180 tablet, Rfl: 3     Objective:     Vitals:    12/19/17 1337   BP: 140/60   BP Location: Right arm   Pulse: 71   Weight: 70.8 kg (156 lb)   Height: 180.3 cm (71\")     Body mass index is 21.76 kg/(m^2).    Physical Exam   Constitutional: He is oriented to person, place, and time. He appears well-developed and well-nourished.   HENT:   Head: Normocephalic.   Nose: Nose normal.   Mouth/Throat: Oropharynx is clear and moist.   Eyes: Conjunctivae and EOM are normal. Pupils are equal, round, and reactive to light.   Neck: Normal range of motion. No JVD present.   Cardiovascular: Normal rate, normal heart sounds and intact distal pulses.  An irregularly irregular rhythm present.   No murmur heard.  Pulmonary/Chest: Effort normal and breath sounds normal.   Abdominal: Soft. He exhibits no mass. There is no tenderness. "   Musculoskeletal: Normal range of motion. He exhibits no edema (trace left leg edema).   S/p right BKA   Lymphadenopathy:     He has no cervical adenopathy.   Neurological: He is alert and oriented to person, place, and time. No cranial nerve deficit.   Skin: Skin is warm and dry. No rash noted.   Psychiatric: He has a normal mood and affect. His behavior is normal. Judgment and thought content normal.   Vitals reviewed.        ECG 12 Lead  Date/Time: 12/19/2017 2:01 PM  Performed by: NIRANJAN KRUEGER  Authorized by: NIRANJAN KRUEGER   Comparison: compared with previous ECG   Similar to previous ECG  Rhythm: atrial fibrillation  ST Depression: all  T flattening: all  QRS axis: left  Clinical impression: abnormal ECG              Assessment:       Diagnosis Plan   1. CAD S/P percutaneous coronary angioplasty     2. Chronic atrial fibrillation     3. Peripheral arterial occlusive disease            Plan:     1.  Coronary Artery Disease  Assessment  • The patient has no angina    Subjective - Objective  • There has been a previous stent procedure using NOLAN 2008  • There has been a previous POBA  • Current antiplatelet therapy includes aspirin 81 mg      2.  Atrial Fibrillation and Atrial Flutter  Assessment  • The patient has permanent atrial fibrillation  • This is non-valvular in etiology  • The patient's CHADS2-VASc score is 5  • A [id number] score of 2 or more is considered a high risk for a thromboembolic event  • Dabigatran prescribed    Plan  • Continue in atrial fibrillation with rate control  • Add apixaban for antithrombotic therapy  • Continue beta blocker for rate control  • I have changed him to apixaban 2.5mg BID due to his insurance company's formulary    3.  He has severe PAD, which is followed by Dr. Nils Cooper.        Sincerely,       Niranjan Krueger MD

## 2017-12-29 NOTE — PROGRESS NOTES
Chief Complaint   Patient presents with   • Nausea     x 1 week    • Vomiting   • Wheezing   • Cough       Upper Respiratory Infection: Patient complains of symptoms of a URI. Symptoms include congestion, cough, fever, irritability and sore throat. Onset of symptoms was 1 week ago, gradually worsening since that time. He also c/o lightheadedness, low grade fever, nasal congestion and productive cough with  green colored sputum for the past 1 week .  He is not drinking much. Evaluation to date: none. Treatment to date: cough suppressants.  Ill contacts at home or school or work discussed.  His wife is also ill here as well today  Rapid flu swab is neg.        Vitals:    12/29/17 0827   BP: 128/68   BP Location: Left arm   Patient Position: Sitting   Temp: 98 °F (36.7 °C)   Weight: 70.4 kg (155 lb 3.2 oz)     Gen: mildly ill appearing, alert  Ears: Tm's  without redness  Nose:  Congestion  Throat:  Red without exudate, some drainage, tonsils okay  Neck: no LAD  Lung: mild rales and rhonchi thru out, good air movement, regular RR  Heart: RR without murmur  Skin: no rash.    Results for orders placed or performed in visit on 12/29/17   POC Influenza A / B   Result Value Ref Range    Rapid Influenza A Ag Negative     Rapid Influenza B Ag Negative     Internal Control Passed Passed    Lot Number [id number]     Expiration Date 11/2018        Assessment/Plan   Santy was seen today for nausea, vomiting, wheezing and cough.    Diagnoses and all orders for this visit:    Bronchitis  -     cephalexin (KEFLEX) 250 MG capsule; Take 1 capsule by mouth 4 (Four) Times a Day for 7 days. Indications: Pneumonia  -     Nebulizer Treatment  -     POC Influenza A / B  -     albuterol (PROVENTIL) nebulizer solution 0.083% 2.5 mg/3mL; Take 2.5 mg by nebulization 1 (One) Time.      Will renal dose kelfex  Home mucinex bid  mininal change after neb, lots of coughing.         There are no Patient Instructions on file for this visit.    Tylenol  or Advil as needed for pain, fever, muscle aches  Plenty of fluids  Hand washing discussed  Off work or school note given if needed.  Warm tea for throat.  Pros and cons of antibiotic use discussed    Dr. Wilmer Parada MD  Family Practice  [place], [place]  [facility]

## 2018-01-01 RX ORDER — DABIGATRAN ETEXILATE MESYLATE 75 MG/1
CAPSULE ORAL
Qty: 180 CAPSULE | Refills: 3 | OUTPATIENT
Start: 2018-01-01

## 2018-01-01 RX ORDER — ALBUTEROL SULFATE 2.5 MG/3ML
2.5 SOLUTION RESPIRATORY (INHALATION) ONCE
Status: SHIPPED | OUTPATIENT
Start: 2017-01-01

## 2018-01-05 NOTE — TELEPHONE ENCOUNTER
Received a refill request for pt's Pradaxa 75 mg but pt has been changed to Apixaban 2.5mg bid.  Denied request, sent communication/ fax to pharmacy.     Plan  • Continue in atrial fibrillation with rate control  • Add apixaban for antithrombotic therapy  • Continue beta blocker for rate control  • I have changed him to apixaban 2.5mg BID due to his insurance company's formulary